# Patient Record
Sex: FEMALE | Race: BLACK OR AFRICAN AMERICAN | URBAN - METROPOLITAN AREA
[De-identification: names, ages, dates, MRNs, and addresses within clinical notes are randomized per-mention and may not be internally consistent; named-entity substitution may affect disease eponyms.]

---

## 2021-10-28 ENCOUNTER — OFFICE VISIT (OUTPATIENT)
Dept: URBAN - METROPOLITAN AREA CLINIC 54 | Facility: CLINIC | Age: 46
End: 2021-10-28
Payer: COMMERCIAL

## 2021-10-28 ENCOUNTER — DASHBOARD ENCOUNTERS (OUTPATIENT)
Age: 46
End: 2021-10-28

## 2021-10-28 ENCOUNTER — WEB ENCOUNTER (OUTPATIENT)
Dept: URBAN - METROPOLITAN AREA CLINIC 54 | Facility: CLINIC | Age: 46
End: 2021-10-28

## 2021-10-28 ENCOUNTER — TELEPHONE ENCOUNTER (OUTPATIENT)
Dept: URBAN - METROPOLITAN AREA CLINIC 92 | Facility: CLINIC | Age: 46
End: 2021-10-28

## 2021-10-28 DIAGNOSIS — K22.2 LOWER ESOPHAGEAL RING (SCHATZKI): ICD-10-CM

## 2021-10-28 DIAGNOSIS — K92.1 HEMATOCHEZIA: ICD-10-CM

## 2021-10-28 DIAGNOSIS — K44.9 HIATAL HERNIA: ICD-10-CM

## 2021-10-28 DIAGNOSIS — R12 HEARTBURN: ICD-10-CM

## 2021-10-28 PROBLEM — 235623002: Status: ACTIVE | Noted: 2021-10-28

## 2021-10-28 PROCEDURE — 99204 OFFICE O/P NEW MOD 45 MIN: CPT | Performed by: INTERNAL MEDICINE

## 2021-10-28 RX ORDER — SODIUM SULFATE, MAGNESIUM SULFATE, AND POTASSIUM CHLORIDE 17.75; 2.7; 2.25 G/1; G/1; G/1
12 TABLETS TABLET ORAL
Qty: 24 TABLETS | Refills: 0 | OUTPATIENT
Start: 2021-10-28 | End: 2021-10-29

## 2021-10-28 NOTE — HPI-TODAY'S VISIT:
The patient is a 43 year old female , who presents on referral from [facility] , for a gastroenterology evaluation for above issues. She c/o dysphagia to solids x 3 months. No problems with liquids. No symptoms of GERD. Her symptoms are non-progressive. She has tried acid suppression without any help. Recent barium swallow showed a small hiatal hernia. She considers her job stressful. Never had an EGD.   Follow Up 10/28/21: Patient presents for routine follow up. EGD showed a non-obstructive Schatzkis in 2018 s/p dilation with 54 F Savary. She is here for mild hematochezia. No changes in bowel habits, perianal pain, abdominal pain or weight loss. No use of blood thinners or NSAID's. No family history of CRC. No cardiopulmonary disease.

## 2021-11-15 ENCOUNTER — OFFICE VISIT (OUTPATIENT)
Dept: URBAN - METROPOLITAN AREA SURGERY CENTER 14 | Facility: SURGERY CENTER | Age: 46
End: 2021-11-15
Payer: COMMERCIAL

## 2021-11-15 DIAGNOSIS — K92.1 ACUTE MELENA: ICD-10-CM

## 2021-11-15 PROCEDURE — 45378 DIAGNOSTIC COLONOSCOPY: CPT | Performed by: INTERNAL MEDICINE

## 2021-11-15 PROCEDURE — G8907 PT DOC NO EVENTS ON DISCHARG: HCPCS | Performed by: INTERNAL MEDICINE
